# Patient Record
Sex: FEMALE | URBAN - METROPOLITAN AREA
[De-identification: names, ages, dates, MRNs, and addresses within clinical notes are randomized per-mention and may not be internally consistent; named-entity substitution may affect disease eponyms.]

---

## 2023-02-13 ENCOUNTER — APPOINTMENT (RX ONLY)
Dept: URBAN - METROPOLITAN AREA CLINIC 63 | Facility: CLINIC | Age: 38
Setting detail: DERMATOLOGY
End: 2023-02-13

## 2023-02-13 DIAGNOSIS — L72.8 OTHER FOLLICULAR CYSTS OF THE SKIN AND SUBCUTANEOUS TISSUE: ICD-10-CM

## 2023-02-13 DIAGNOSIS — D18.0 HEMANGIOMA: ICD-10-CM

## 2023-02-13 DIAGNOSIS — D22 MELANOCYTIC NEVI: ICD-10-CM

## 2023-02-13 DIAGNOSIS — L71.8 OTHER ROSACEA: ICD-10-CM | Status: INADEQUATELY CONTROLLED

## 2023-02-13 DIAGNOSIS — L81.4 OTHER MELANIN HYPERPIGMENTATION: ICD-10-CM

## 2023-02-13 DIAGNOSIS — Z80.8 FAMILY HISTORY OF MALIGNANT NEOPLASM OF OTHER ORGANS OR SYSTEMS: ICD-10-CM

## 2023-02-13 DIAGNOSIS — Z12.83 ENCOUNTER FOR SCREENING FOR MALIGNANT NEOPLASM OF SKIN: ICD-10-CM

## 2023-02-13 PROBLEM — D22.71 MELANOCYTIC NEVI OF RIGHT LOWER LIMB, INCLUDING HIP: Status: ACTIVE | Noted: 2023-02-13

## 2023-02-13 PROBLEM — D18.01 HEMANGIOMA OF SKIN AND SUBCUTANEOUS TISSUE: Status: ACTIVE | Noted: 2023-02-13

## 2023-02-13 PROBLEM — D22.61 MELANOCYTIC NEVI OF RIGHT UPPER LIMB, INCLUDING SHOULDER: Status: ACTIVE | Noted: 2023-02-13

## 2023-02-13 PROBLEM — D22.5 MELANOCYTIC NEVI OF TRUNK: Status: ACTIVE | Noted: 2023-02-13

## 2023-02-13 PROCEDURE — ? ADDITIONAL NOTES

## 2023-02-13 PROCEDURE — 99204 OFFICE O/P NEW MOD 45 MIN: CPT

## 2023-02-13 PROCEDURE — ? COUNSELING

## 2023-02-13 PROCEDURE — ? PRESCRIPTION

## 2023-02-13 RX ORDER — IVERMECTIN 10 MG/G
CREAM TOPICAL
Qty: 45 | Refills: 1 | Status: ERX | COMMUNITY
Start: 2023-02-13

## 2023-02-13 RX ADMIN — IVERMECTIN: 10 CREAM TOPICAL at 00:00

## 2023-02-13 ASSESSMENT — LOCATION SIMPLE DESCRIPTION DERM
LOCATION SIMPLE: RIGHT ANTERIOR NECK
LOCATION SIMPLE: LEFT CHEEK
LOCATION SIMPLE: LEFT SHOULDER
LOCATION SIMPLE: POSTERIOR SCALP
LOCATION SIMPLE: RIGHT PLANTAR SURFACE
LOCATION SIMPLE: LEFT UPPER BACK
LOCATION SIMPLE: RIGHT CHEEK
LOCATION SIMPLE: NOSE
LOCATION SIMPLE: RIGHT POSTERIOR UPPER ARM

## 2023-02-13 ASSESSMENT — LOCATION DETAILED DESCRIPTION DERM
LOCATION DETAILED: LEFT INFERIOR POSTAURICULAR SKIN
LOCATION DETAILED: NASAL SUPRATIP
LOCATION DETAILED: LEFT INFERIOR CENTRAL MALAR CHEEK
LOCATION DETAILED: RIGHT INFERIOR CENTRAL MALAR CHEEK
LOCATION DETAILED: RIGHT PROXIMAL POSTERIOR UPPER ARM
LOCATION DETAILED: RIGHT MEDIAL PLANTAR MIDFOOT
LOCATION DETAILED: LEFT MID-UPPER BACK
LOCATION DETAILED: LEFT ANTERIOR SHOULDER
LOCATION DETAILED: RIGHT CLAVICULAR NECK

## 2023-02-13 ASSESSMENT — LOCATION ZONE DERM
LOCATION ZONE: NECK
LOCATION ZONE: ARM
LOCATION ZONE: FEET
LOCATION ZONE: FACE
LOCATION ZONE: TRUNK
LOCATION ZONE: SCALP
LOCATION ZONE: NOSE

## 2023-02-13 NOTE — HPI: FULL BODY SKIN EXAMINATION
What Type Of Note Output Would You Prefer (Optional)?: Bullet Format
What Is The Reason For Today's Visit?: Full Body Skin Examination
What Is The Reason For Today's Visit? (Being Monitored For X): concerning skin lesions on a periodic basis
How Severe Are Your Spot(S)?: moderate
Additional History: Patient presents for a full body skin exam. Patient has a recurring on the face she would like help to manage.

## 2023-02-13 NOTE — PROCEDURE: ADDITIONAL NOTES
Render Risk Assessment In Note?: no
Detail Level: Generalized
Additional Notes: Dr. Trujillo discussed treatment options in depth and answered all the patients questions. Dr. Trujillo gave the patient a Rosacea handout and recommended topical products for patients daily skin regimen.

## 2023-03-27 ENCOUNTER — APPOINTMENT (RX ONLY)
Dept: URBAN - METROPOLITAN AREA CLINIC 63 | Facility: CLINIC | Age: 38
Setting detail: DERMATOLOGY
End: 2023-03-27

## 2023-03-27 DIAGNOSIS — L71.8 OTHER ROSACEA: ICD-10-CM | Status: WELL CONTROLLED

## 2023-03-27 DIAGNOSIS — L98.9 DISORDER OF THE SKIN AND SUBCUTANEOUS TISSUE, UNSPECIFIED: ICD-10-CM

## 2023-03-27 PROCEDURE — ? COUNSELING

## 2023-03-27 PROCEDURE — ? PRESCRIPTION MEDICATION MANAGEMENT

## 2023-03-27 PROCEDURE — ? PRESCRIPTION

## 2023-03-27 PROCEDURE — 99213 OFFICE O/P EST LOW 20 MIN: CPT

## 2023-03-27 PROCEDURE — ? ADDITIONAL NOTES

## 2023-03-27 RX ORDER — IVERMECTIN 10 MG/G
CREAM TOPICAL
Qty: 45 | Refills: 11 | Status: ERX

## 2023-03-27 ASSESSMENT — LOCATION SIMPLE DESCRIPTION DERM
LOCATION SIMPLE: LEFT CHEEK
LOCATION SIMPLE: NOSE
LOCATION SIMPLE: RIGHT CHEEK

## 2023-03-27 ASSESSMENT — LOCATION DETAILED DESCRIPTION DERM
LOCATION DETAILED: NASAL SUPRATIP
LOCATION DETAILED: RIGHT INFERIOR CENTRAL MALAR CHEEK
LOCATION DETAILED: LEFT INFERIOR CENTRAL MALAR CHEEK

## 2023-03-27 ASSESSMENT — LOCATION ZONE DERM
LOCATION ZONE: NOSE
LOCATION ZONE: FACE

## 2023-03-27 NOTE — PROCEDURE: COUNSELING
Detail Level: Zone From: Chadd Mclaughlin  To: Bryan Bran MD  Sent: 10/24/2017 4:32 PM CDT  Subject: Linn    Good afternoon Dr. Bran.   I hope all is well with you. I am writing regarding Linn's sleeping issues that she had spoken to you about when you saw her. She is still having sleeping issues and I am wondering if you have any recommendations such as a sleep study. I had told her that she is too young not to sleep the rest of her life. Her symptoms in her own words are as follows: \"Either not going to sleep at all or going to sleep and then waking up in the middle of the night and not being able to fall back asleep for a few hours. These issues have gone on for years. It is possible that the meds she is on may have some effect on her sleep, but probably not enough to cause all these years worth of sleep problems.She has a physical scheduled with you on Dec 15th at 9.15 AM. She will be gone from Dec 24-28. She starts next semester in mid January.     Thanks in advance.    Naldo

## 2023-03-27 NOTE — PROCEDURE: PRESCRIPTION MEDICATION MANAGEMENT
Render In Strict Bullet Format?: No
Continue Regimen: ivermectin 1% cream once daily
Detail Level: Zone

## 2023-03-27 NOTE — PROCEDURE: ADDITIONAL NOTES
Detail Level: Zone
Additional Notes: Pt inquiring whether fillers will affect rosacea -- discussed unlikely. Recommend cosmetic consult to discuss r/b/a of fillers in detail.
Render Risk Assessment In Note?: no
Additional Notes: PP component well controlled and pt happy w/ progress. Pt not interested in medical tx of ETT component.

## 2023-05-23 ENCOUNTER — APPOINTMENT (RX ONLY)
Dept: URBAN - METROPOLITAN AREA CLINIC 63 | Facility: CLINIC | Age: 38
Setting detail: DERMATOLOGY
End: 2023-05-23

## 2023-05-23 DIAGNOSIS — H01.13 ECZEMATOUS DERMATITIS OF EYELID: ICD-10-CM

## 2023-05-23 PROBLEM — H01.131 ECZEMATOUS DERMATITIS OF RIGHT UPPER EYELID: Status: ACTIVE | Noted: 2023-05-23

## 2023-05-23 PROBLEM — H01.134 ECZEMATOUS DERMATITIS OF LEFT UPPER EYELID: Status: ACTIVE | Noted: 2023-05-23

## 2023-05-23 PROCEDURE — ? COUNSELING

## 2023-05-23 PROCEDURE — 99213 OFFICE O/P EST LOW 20 MIN: CPT

## 2023-05-23 PROCEDURE — ? PRESCRIPTION

## 2023-05-23 RX ORDER — TACROLIMUS 1 MG/G
OINTMENT TOPICAL
Qty: 30 | Refills: 0 | Status: ERX | COMMUNITY
Start: 2023-05-23

## 2023-05-23 RX ADMIN — TACROLIMUS: 1 OINTMENT TOPICAL at 00:00

## 2023-05-23 ASSESSMENT — LOCATION ZONE DERM: LOCATION ZONE: EYELID

## 2023-05-23 ASSESSMENT — LOCATION DETAILED DESCRIPTION DERM
LOCATION DETAILED: LEFT LATERAL SUPERIOR EYELID
LOCATION DETAILED: RIGHT MEDIAL SUPERIOR EYELID

## 2023-05-23 ASSESSMENT — LOCATION SIMPLE DESCRIPTION DERM
LOCATION SIMPLE: RIGHT SUPERIOR EYELID
LOCATION SIMPLE: LEFT SUPERIOR EYELID

## 2023-06-12 ENCOUNTER — APPOINTMENT (RX ONLY)
Dept: URBAN - METROPOLITAN AREA CLINIC 63 | Facility: CLINIC | Age: 38
Setting detail: DERMATOLOGY
End: 2023-06-12

## 2023-06-12 DIAGNOSIS — H01.13 ECZEMATOUS DERMATITIS OF EYELID: ICD-10-CM | Status: INADEQUATELY CONTROLLED

## 2023-06-12 PROBLEM — H01.134 ECZEMATOUS DERMATITIS OF LEFT UPPER EYELID: Status: ACTIVE | Noted: 2023-06-12

## 2023-06-12 PROBLEM — H01.131 ECZEMATOUS DERMATITIS OF RIGHT UPPER EYELID: Status: ACTIVE | Noted: 2023-06-12

## 2023-06-12 PROCEDURE — ? COUNSELING

## 2023-06-12 PROCEDURE — 99214 OFFICE O/P EST MOD 30 MIN: CPT

## 2023-06-12 PROCEDURE — ? PRESCRIPTION

## 2023-06-12 PROCEDURE — ? ADDITIONAL NOTES

## 2023-06-12 PROCEDURE — ? PRESCRIPTION MEDICATION MANAGEMENT

## 2023-06-12 RX ORDER — RUXOLITINIB 15 MG/G
CREAM TOPICAL
Qty: 60 | Refills: 0 | Status: ERX | COMMUNITY
Start: 2023-06-12

## 2023-06-12 RX ADMIN — RUXOLITINIB: 15 CREAM TOPICAL at 00:00

## 2023-06-12 ASSESSMENT — LOCATION ZONE DERM: LOCATION ZONE: EYELID

## 2023-06-12 ASSESSMENT — LOCATION DETAILED DESCRIPTION DERM
LOCATION DETAILED: LEFT LATERAL SUPERIOR EYELID
LOCATION DETAILED: RIGHT MEDIAL SUPERIOR EYELID

## 2023-06-12 ASSESSMENT — LOCATION SIMPLE DESCRIPTION DERM
LOCATION SIMPLE: LEFT SUPERIOR EYELID
LOCATION SIMPLE: RIGHT SUPERIOR EYELID

## 2023-06-12 NOTE — PROCEDURE: PRESCRIPTION MEDICATION MANAGEMENT
Plan: Will consider Dupixent if remains refractory
Discontinue Regimen: tacrolimus oint
Initiate Treatment: Opzelura cream BID
Detail Level: Zone
Render In Strict Bullet Format?: No

## 2023-06-12 NOTE — PROCEDURE: ADDITIONAL NOTES
Detail Level: Detailed
Render Risk Assessment In Note?: no
Additional Notes: C/o burning and heat sensation 2/2 tacrolimus oint and no improvement, despite 2 wk trial. Pt reports improvement with OTC topical aloe vera. Discussed risk of ACD with aloe vera. Recommend trial of Opzelura instead, however if too expensive counseled may use OTC HC 1% oint up to 2x/weekly and to be cautious re: aloe vera.\\n\\nAlso, discussed that if rash cont to recur over next 4 wks, rec appt with allergy for patch testing.